# Patient Record
Sex: FEMALE | Race: WHITE | NOT HISPANIC OR LATINO
[De-identification: names, ages, dates, MRNs, and addresses within clinical notes are randomized per-mention and may not be internally consistent; named-entity substitution may affect disease eponyms.]

---

## 2020-08-05 ENCOUNTER — APPOINTMENT (OUTPATIENT)
Dept: ORTHOPEDIC SURGERY | Facility: CLINIC | Age: 65
End: 2020-08-05
Payer: COMMERCIAL

## 2020-08-05 VITALS — TEMPERATURE: 97.3 F

## 2020-08-05 VITALS
DIASTOLIC BLOOD PRESSURE: 79 MMHG | BODY MASS INDEX: 20.03 KG/M2 | WEIGHT: 102 LBS | HEIGHT: 60 IN | HEART RATE: 61 BPM | SYSTOLIC BLOOD PRESSURE: 117 MMHG

## 2020-08-05 PROBLEM — Z00.00 ENCOUNTER FOR PREVENTIVE HEALTH EXAMINATION: Status: ACTIVE | Noted: 2020-08-05

## 2020-08-05 PROCEDURE — 73564 X-RAY EXAM KNEE 4 OR MORE: CPT | Mod: RT

## 2020-08-05 PROCEDURE — 99204 OFFICE O/P NEW MOD 45 MIN: CPT

## 2020-08-05 NOTE — DISCUSSION/SUMMARY
[de-identified] : This patient is right knee pain with positive meniscal signs consistent with meniscal contusion versus tear.  The patient is not an appropriate candidate for surgical intervention at this time. An extensive discussion was conducted on the natural history of the disease and the variety of surgical and non-surgical options available to the patient including, but not limited to non-steroidal anti-inflammatory medications, steroid injections, physical therapy, maintenance of ideal body weight, and reduction of activity.  Recommended a course of Mobic and a home exercise program.  She can also use a neoprene sleeve knee brace.  The patient will schedule an appointment as needed.\par

## 2020-08-05 NOTE — PHYSICAL EXAM
[de-identified] : Patient is well nourished, well-developed, in no acute distress, with appropriate mood and affect. The patient is oriented to time, place, and person. Respirations are even and unlabored. Gait evaluation does not reveal a limp. There is no inguinal adenopathy. Examination of the contralateral knee shows normal range of motion, strength, no tenderness, and intact skin. The affected limb is well-perfused and showed 2+ dp/pt pulses, without skin lesions, shows a grossly normal motor and sensory examination. Knee motion is painless and the knee moves from 0 to 135 degrees. The knee is stable within that range-of-motion to AP and ML stress with a 1A Lachman, negative anterior or posterior drawer and no instability to varus or valgus stress. The alignment of the knee is 5 degrees varus. No effusion or crepitus is noted.  Tender to palpation in the medial joint line.  No tenderness to palpation about the lateral joint line, medial or lateral tibial plateau, medial or lateral femoral condyle, medial or lateral patellar facets, superior or inferior pole of the patella. Ember's is negative and Thessaly is negative. Muscle strength is normal. Pedal pulses are palpable. Hip examination was negative. [de-identified] : Long standing knee, AP knee, lateral knee, and patellar views of the right knee were ordered and taken in the office and demonstrate no evidence of degenerative joint disease of the knee, fractures, intra-articular pathology.

## 2020-08-05 NOTE — HISTORY OF PRESENT ILLNESS
[de-identified] : Patient is a very nice 65 year old female presents today for initial visit with chief complaint of right knee pain. She says this pain started a 1 month ago from playing tennis when she was twisting her knee in a bent position. She describes a mild to moderate dull/achy pain in the anteromedial aspect of the knee, worse with general physical activity and improved nicely with rest. Denies catching/clicking/locking of the knee, radiating pain, or distal neuropathy. She says she cannot get MRIs due to having an iron oxide implant in her face for makeup and cannot receive cortisone injections with having a history retinopathy years ago and was told she should never receive any cortisone medication.  She does not take NSAIDs for this.  She does not use Tylenol.  She is not tried any other types of injections.  She is not tried a neoprene sleeve knee brace.  She is not tried physical therapy.  She does not use a cane or walker.  The patient denies any radiation of the pain to the feet and it is not associated with numbness, tingling, or weakness.  Walking tolerance is not limited.  Activities daily living are only mildly impaired.\par The patient's past medical history, past surgical history, medications, allergies, and social history were reviewed by me today with the patient and documented accordingly. In addition, the patient's family history, which is noncontributory to this visit, was also reviewed.

## 2020-09-30 ENCOUNTER — APPOINTMENT (OUTPATIENT)
Dept: ORTHOPEDIC SURGERY | Facility: CLINIC | Age: 65
End: 2020-09-30
Payer: COMMERCIAL

## 2020-09-30 DIAGNOSIS — M25.561 PAIN IN RIGHT KNEE: ICD-10-CM

## 2020-09-30 PROCEDURE — 99213 OFFICE O/P EST LOW 20 MIN: CPT

## 2020-09-30 NOTE — DISCUSSION/SUMMARY
[de-identified] : This patient's right knee pain is now resolved.  This likely was a meniscal contusion causing the pain.  Continue with conservative management including neoprene sleeve knee brace and meloxicam.  She can restart more aggressive activity on a slowly increased basis.  Home exercise program was reviewed with the patient.  Follow-up on a as needed basis.

## 2020-09-30 NOTE — PHYSICAL EXAM
[de-identified] : Patient is well nourished, well-developed, in no acute distress, with appropriate mood and affect. The patient is oriented to time, place, and person. Respirations are even and unlabored. Gait evaluation does not reveal a limp. There is no inguinal adenopathy. Examination of the contralateral knee shows normal range of motion, strength, no tenderness, and intact skin. The affected limb is well-perfused and showed 2+ dp/pt pulses, without skin lesions, shows a grossly normal motor and sensory examination. Knee motion is painless and the knee moves from 0 to 135 degrees. The knee is stable within that range-of-motion to AP and ML stress with a 1A Lachman, negative anterior or posterior drawer and no instability to varus or valgus stress. The alignment of the knee is 5 degrees varus. No effusion or crepitus is noted. No tenderness to palpation about the medial or lateral joint line, medial or lateral tibial plateau, medial or lateral femoral condyle, medial or lateral patellar facets, superior or inferior pole of the patella. Ember's is negative. Muscle strength is normal. Pedal pulses are palpable. Hip examination was negative.

## 2020-09-30 NOTE — HISTORY OF PRESENT ILLNESS
[de-identified] : Patient is a very nice 65 year old female presents today for initial visit with chief complaint of right knee pain.  Overall significantly improved.  She says this pain started a 1 month ago from playing tennis when she was twisting her knee in a bent position. She describes a mild to moderate dull/achy pain in the anteromedial aspect of the knee, worse with general physical activity and improved nicely with rest. Denies catching/clicking/locking of the knee, radiating pain, or distal neuropathy. She says she cannot get MRIs due to having an iron oxide implant in her face for makeup and cannot receive cortisone injections with having a history retinopathy years ago and was told she should never receive any cortisone medication.  Mobic has helped improve the pain.  She does not use Tylenol. She is not tried any other types of injections.  Neoprene sleeve knee brace has been very helpful.  Rest has been helpful.. She is not tried physical therapy. She does not use a cane or walker. The patient denies any radiation of the pain to the feet and it is not associated with numbness, tingling, or weakness. Walking tolerance is not limited. Activities daily living are only mildly impaired.\par The patient's past medical history, past surgical history, medications, allergies, and social history were reviewed by me today with the patient and documented accordingly. In addition, the patient's family history, which is noncontributory to this visit, was also reviewed.

## 2020-12-27 ENCOUNTER — TRANSCRIPTION ENCOUNTER (OUTPATIENT)
Age: 65
End: 2020-12-27

## 2020-12-28 ENCOUNTER — OUTPATIENT (OUTPATIENT)
Dept: OUTPATIENT SERVICES | Facility: HOSPITAL | Age: 65
LOS: 1 days | Discharge: HOME | End: 2020-12-28

## 2020-12-28 DIAGNOSIS — Z98.89 OTHER SPECIFIED POSTPROCEDURAL STATES: Chronic | ICD-10-CM

## 2021-07-21 ENCOUNTER — APPOINTMENT (OUTPATIENT)
Dept: ORTHOPEDIC SURGERY | Facility: CLINIC | Age: 66
End: 2021-07-21
Payer: MEDICARE

## 2021-07-21 DIAGNOSIS — M25.572 PAIN IN LEFT ANKLE AND JOINTS OF LEFT FOOT: ICD-10-CM

## 2021-07-21 PROCEDURE — 73610 X-RAY EXAM OF ANKLE: CPT | Mod: LT

## 2021-07-21 PROCEDURE — 99213 OFFICE O/P EST LOW 20 MIN: CPT

## 2021-07-21 NOTE — DISCUSSION/SUMMARY
[de-identified] : This patient has peroneal tendinitis.  The patient is not an appropriate candidate for surgical intervention at this time. An extensive discussion was conducted on the natural history of the disease and the variety of surgical and non-surgical options available to the patient including, but not limited to non-steroidal anti-inflammatory medications, steroid injections, physical therapy, maintenance of ideal body weight, and reduction of activity.  Recommended stretching.  Recommended Voltaren gel.  If the pain continues over the next 4 to 6 weeks then I will refer to my foot ankle partners.\par The patient will schedule an appointment as needed.\par

## 2021-07-21 NOTE — HISTORY OF PRESENT ILLNESS
[de-identified] : This is very nice 66-year-old feet experiencing left tibia and ankle pain, which is mild in intensity.  The pain started atraumatically approximately 1 month ago.  It only hurts her at night.  It woke her from sleep 1 time.  No fevers or chills.  No pain during the day.  She is even able to play singles tennis without pain.  No traumas.  Had an ultrasound to rule out DVT.  Has not taken any medications for this.  Her ankles not giving way.  She does not require cane or walker for ambulation.  Has not tried a home exercise program or formal physical therapy.  The patient denies any radiation of the pain to the feet and it is not associated with numbness, tingling, or weakness.

## 2021-07-21 NOTE — PHYSICAL EXAM
[de-identified] : Patient is well nourished, well-developed, in no acute distress, with appropriate mood and affect. The patient is oriented to time, place, and person. Respirations are even and unlabored. Gait evaluation does not reveal a limp. There is no inguinal adenopathy. Examination of the contralateral knee shows normal range of motion, strength, no tenderness, and intact skin. The affected limb is well-perfused and showed 2+ dp/pt pulses, without skin lesions, shows a grossly normal motor and sensory examination. Knee motion is painless and the knee moves from 0 to 135 degrees. The knee is stable within that range-of-motion to AP and ML stress with a 1A Lachman, negative anterior or posterior drawer and no instability to varus or valgus stress. The alignment of the knee is 5 degrees varus. No effusion or crepitus is noted. No tenderness to palpation about the medial or lateral joint line, medial or lateral tibial plateau, medial or lateral femoral condyle, medial or lateral patellar facets, superior or inferior pole of the patella. Ember's is negative. Muscle strength is normal. Pedal pulses are palpable. Hip examination was negative.  Minimally tender to palpation over the peroneal tendons at the muscle belly junction.  Ankle range of motion is 15 degrees dorsiflexion to 25 degrees plantarflexion.  Negative anterior drawer. [de-identified] : AP, mortise, lateral radiographs of the left ankle were ordered obtained the office demonstrate no evidence of fracture or dislocation or other abnormalities.

## 2022-10-07 ENCOUNTER — APPOINTMENT (OUTPATIENT)
Dept: ORTHOPEDIC SURGERY | Facility: CLINIC | Age: 67
End: 2022-10-07

## 2022-10-07 VITALS
HEIGHT: 60 IN | BODY MASS INDEX: 20.03 KG/M2 | WEIGHT: 102 LBS | DIASTOLIC BLOOD PRESSURE: 80 MMHG | HEART RATE: 61 BPM | SYSTOLIC BLOOD PRESSURE: 117 MMHG

## 2022-10-07 DIAGNOSIS — M25.561 PAIN IN RIGHT KNEE: ICD-10-CM

## 2022-10-07 DIAGNOSIS — M25.562 PAIN IN RIGHT KNEE: ICD-10-CM

## 2022-10-07 DIAGNOSIS — G89.29 PAIN IN RIGHT KNEE: ICD-10-CM

## 2022-10-07 PROCEDURE — 99214 OFFICE O/P EST MOD 30 MIN: CPT

## 2022-10-07 PROCEDURE — 73564 X-RAY EXAM KNEE 4 OR MORE: CPT | Mod: 50

## 2022-10-07 NOTE — DISCUSSION/SUMMARY
[de-identified] : This patient has right more than left knee pain which may represent a meniscal contusion.  The patient is not an appropriate candidate for surgical intervention at this time. An extensive discussion was conducted on the natural history of the disease and the variety of surgical and non-surgical options available to the patient including, but not limited to non-steroidal anti-inflammatory medications, steroid injections, physical therapy, maintenance of ideal body weight, and reduction of activity. Continue with conservative management including neoprene sleeve knee brace and meloxicam.  She can restart more aggressive activity on a slowly increased basis.  Home exercise program was reviewed with the patient.  Follow-up on a as needed basis.

## 2022-10-07 NOTE — PHYSICAL EXAM
[de-identified] : Patient is well nourished, well-developed, in no acute distress, with appropriate mood and affect. The patient is oriented to time, place, and person. Respirations are even and unlabored. Gait evaluation does not reveal a limp. There is no inguinal adenopathy. The right limb is well-perfused and showed 2+ dp/pt pulses, without skin lesions, shows a grossly normal motor and sensory examination. Right knee motion is painless and the knee moves from 0 to 135 degrees. The knee is stable within that range-of-motion to AP and ML stress with a 1A Lachman, negative anterior or posterior drawer and no instability to varus or valgus stress. The alignment of the knee is 5 degrees varus. No effusion or crepitus is noted. No tenderness to palpation about the medial or lateral joint line, medial or lateral tibial plateau, medial or lateral femoral condyle, medial or lateral patellar facets, superior or inferior pole of the patella. Ember's is negative. Muscle strength is normal. Pedal pulses are palpable. Hip examination was negative. The left limb is well-perfused and showed 2+ dp/pt pulses, without skin lesions, shows a grossly normal motor and sensory examination. Left knee motion is painless and the knee moves from 0 to 135 degrees. The knee is stable within that range-of-motion to AP and ML stress with a 1A Lachman, negative anterior or posterior drawer and no instability to varus or valgus stress. The alignment of the knee is 5 degrees varus. No effusion or crepitus is noted. No tenderness to palpation about the medial or lateral joint line, medial or lateral tibial plateau, medial or lateral femoral condyle, medial or lateral patellar facets, superior or inferior pole of the patella. Ember's is negative. Muscle strength is normal. Pedal pulses are palpable. Hip examination was negative. [de-identified] : Long standing  knee, AP knee, lateral knee, tunnel and patellar views of the bilat  knee were ordered and taken in the office and do not show any arthritic changes or loss of joint space.

## 2022-10-07 NOTE — HISTORY OF PRESENT ILLNESS
[de-identified] : Patient is a very nice 67 year old female presents today for right knee pain.  She has intermittent pain in the right knee and mild pain in the left knee and the right knee pain is moderate.  The pain is a fluctuating chronically for more than 3 months.  Recently again the pain flared again of the right knee medially. She describes a mild to moderate dull/achy pain in the anteromedial aspect of the knee, worse with general physical activity and improved nicely with rest. Denies catching/clicking/locking of the knee, radiating pain, or distal neuropathy. She says she cannot get MRIs due to having an iron oxide implant in her face for makeup and cannot receive cortisone injections with having a history retinopathy years ago and was told she should never receive any cortisone medication.  . She is not tried any other types of injections.  Neoprene sleeve knee brace has been very helpful.  .. She is not tried physical therapy. She does not use a cane or walker. The patient denies any radiation of the pain to the feet and it is not associated with numbness, tingling, or weakness. Walking tolerance is not limited. Activities daily living are only mildly impaired.

## 2023-10-31 ENCOUNTER — EMERGENCY (EMERGENCY)
Facility: HOSPITAL | Age: 68
LOS: 1 days | Discharge: HOME | End: 2023-10-31
Attending: EMERGENCY MEDICINE | Admitting: EMERGENCY MEDICINE
Payer: MEDICARE

## 2023-10-31 VITALS
TEMPERATURE: 98 F | DIASTOLIC BLOOD PRESSURE: 70 MMHG | SYSTOLIC BLOOD PRESSURE: 113 MMHG | OXYGEN SATURATION: 100 % | HEART RATE: 81 BPM | RESPIRATION RATE: 16 BRPM

## 2023-10-31 VITALS
SYSTOLIC BLOOD PRESSURE: 117 MMHG | HEART RATE: 74 BPM | OXYGEN SATURATION: 99 % | DIASTOLIC BLOOD PRESSURE: 79 MMHG | RESPIRATION RATE: 17 BRPM | TEMPERATURE: 98 F

## 2023-10-31 DIAGNOSIS — Z98.89 OTHER SPECIFIED POSTPROCEDURAL STATES: Chronic | ICD-10-CM

## 2023-10-31 LAB
ALBUMIN SERPL ELPH-MCNC: 4 G/DL — SIGNIFICANT CHANGE UP (ref 3.3–5)
ALBUMIN SERPL ELPH-MCNC: 4 G/DL — SIGNIFICANT CHANGE UP (ref 3.3–5)
ALP SERPL-CCNC: 75 U/L — SIGNIFICANT CHANGE UP (ref 40–120)
ALP SERPL-CCNC: 75 U/L — SIGNIFICANT CHANGE UP (ref 40–120)
ALT FLD-CCNC: 6 U/L — SIGNIFICANT CHANGE UP (ref 4–33)
ALT FLD-CCNC: 6 U/L — SIGNIFICANT CHANGE UP (ref 4–33)
ANION GAP SERPL CALC-SCNC: 13 MMOL/L — SIGNIFICANT CHANGE UP (ref 7–14)
ANION GAP SERPL CALC-SCNC: 13 MMOL/L — SIGNIFICANT CHANGE UP (ref 7–14)
APTT BLD: 33.5 SEC — SIGNIFICANT CHANGE UP (ref 24.5–35.6)
APTT BLD: 33.5 SEC — SIGNIFICANT CHANGE UP (ref 24.5–35.6)
AST SERPL-CCNC: 12 U/L — SIGNIFICANT CHANGE UP (ref 4–32)
AST SERPL-CCNC: 12 U/L — SIGNIFICANT CHANGE UP (ref 4–32)
BASOPHILS # BLD AUTO: 0.05 K/UL — SIGNIFICANT CHANGE UP (ref 0–0.2)
BASOPHILS # BLD AUTO: 0.05 K/UL — SIGNIFICANT CHANGE UP (ref 0–0.2)
BASOPHILS NFR BLD AUTO: 0.8 % — SIGNIFICANT CHANGE UP (ref 0–2)
BASOPHILS NFR BLD AUTO: 0.8 % — SIGNIFICANT CHANGE UP (ref 0–2)
BILIRUB SERPL-MCNC: 0.3 MG/DL — SIGNIFICANT CHANGE UP (ref 0.2–1.2)
BILIRUB SERPL-MCNC: 0.3 MG/DL — SIGNIFICANT CHANGE UP (ref 0.2–1.2)
BUN SERPL-MCNC: 13 MG/DL — SIGNIFICANT CHANGE UP (ref 7–23)
BUN SERPL-MCNC: 13 MG/DL — SIGNIFICANT CHANGE UP (ref 7–23)
CALCIUM SERPL-MCNC: 9.4 MG/DL — SIGNIFICANT CHANGE UP (ref 8.4–10.5)
CALCIUM SERPL-MCNC: 9.4 MG/DL — SIGNIFICANT CHANGE UP (ref 8.4–10.5)
CHLORIDE SERPL-SCNC: 104 MMOL/L — SIGNIFICANT CHANGE UP (ref 98–107)
CHLORIDE SERPL-SCNC: 104 MMOL/L — SIGNIFICANT CHANGE UP (ref 98–107)
CO2 SERPL-SCNC: 25 MMOL/L — SIGNIFICANT CHANGE UP (ref 22–31)
CO2 SERPL-SCNC: 25 MMOL/L — SIGNIFICANT CHANGE UP (ref 22–31)
CREAT SERPL-MCNC: 0.69 MG/DL — SIGNIFICANT CHANGE UP (ref 0.5–1.3)
CREAT SERPL-MCNC: 0.69 MG/DL — SIGNIFICANT CHANGE UP (ref 0.5–1.3)
EGFR: 95 ML/MIN/1.73M2 — SIGNIFICANT CHANGE UP
EGFR: 95 ML/MIN/1.73M2 — SIGNIFICANT CHANGE UP
EOSINOPHIL # BLD AUTO: 0.06 K/UL — SIGNIFICANT CHANGE UP (ref 0–0.5)
EOSINOPHIL # BLD AUTO: 0.06 K/UL — SIGNIFICANT CHANGE UP (ref 0–0.5)
EOSINOPHIL NFR BLD AUTO: 0.9 % — SIGNIFICANT CHANGE UP (ref 0–6)
EOSINOPHIL NFR BLD AUTO: 0.9 % — SIGNIFICANT CHANGE UP (ref 0–6)
GLUCOSE SERPL-MCNC: 85 MG/DL — SIGNIFICANT CHANGE UP (ref 70–99)
GLUCOSE SERPL-MCNC: 85 MG/DL — SIGNIFICANT CHANGE UP (ref 70–99)
HCT VFR BLD CALC: 39.7 % — SIGNIFICANT CHANGE UP (ref 34.5–45)
HCT VFR BLD CALC: 39.7 % — SIGNIFICANT CHANGE UP (ref 34.5–45)
HGB BLD-MCNC: 12.7 G/DL — SIGNIFICANT CHANGE UP (ref 11.5–15.5)
HGB BLD-MCNC: 12.7 G/DL — SIGNIFICANT CHANGE UP (ref 11.5–15.5)
IANC: 4.35 K/UL — SIGNIFICANT CHANGE UP (ref 1.8–7.4)
IANC: 4.35 K/UL — SIGNIFICANT CHANGE UP (ref 1.8–7.4)
IMM GRANULOCYTES NFR BLD AUTO: 0.3 % — SIGNIFICANT CHANGE UP (ref 0–0.9)
IMM GRANULOCYTES NFR BLD AUTO: 0.3 % — SIGNIFICANT CHANGE UP (ref 0–0.9)
INR BLD: 1.11 RATIO — SIGNIFICANT CHANGE UP (ref 0.85–1.18)
INR BLD: 1.11 RATIO — SIGNIFICANT CHANGE UP (ref 0.85–1.18)
LYMPHOCYTES # BLD AUTO: 1.64 K/UL — SIGNIFICANT CHANGE UP (ref 1–3.3)
LYMPHOCYTES # BLD AUTO: 1.64 K/UL — SIGNIFICANT CHANGE UP (ref 1–3.3)
LYMPHOCYTES # BLD AUTO: 25 % — SIGNIFICANT CHANGE UP (ref 13–44)
LYMPHOCYTES # BLD AUTO: 25 % — SIGNIFICANT CHANGE UP (ref 13–44)
MAGNESIUM SERPL-MCNC: 2.4 MG/DL — SIGNIFICANT CHANGE UP (ref 1.6–2.6)
MAGNESIUM SERPL-MCNC: 2.4 MG/DL — SIGNIFICANT CHANGE UP (ref 1.6–2.6)
MCHC RBC-ENTMCNC: 29.4 PG — SIGNIFICANT CHANGE UP (ref 27–34)
MCHC RBC-ENTMCNC: 29.4 PG — SIGNIFICANT CHANGE UP (ref 27–34)
MCHC RBC-ENTMCNC: 32 GM/DL — SIGNIFICANT CHANGE UP (ref 32–36)
MCHC RBC-ENTMCNC: 32 GM/DL — SIGNIFICANT CHANGE UP (ref 32–36)
MCV RBC AUTO: 91.9 FL — SIGNIFICANT CHANGE UP (ref 80–100)
MCV RBC AUTO: 91.9 FL — SIGNIFICANT CHANGE UP (ref 80–100)
MONOCYTES # BLD AUTO: 0.43 K/UL — SIGNIFICANT CHANGE UP (ref 0–0.9)
MONOCYTES # BLD AUTO: 0.43 K/UL — SIGNIFICANT CHANGE UP (ref 0–0.9)
MONOCYTES NFR BLD AUTO: 6.6 % — SIGNIFICANT CHANGE UP (ref 2–14)
MONOCYTES NFR BLD AUTO: 6.6 % — SIGNIFICANT CHANGE UP (ref 2–14)
NEUTROPHILS # BLD AUTO: 4.35 K/UL — SIGNIFICANT CHANGE UP (ref 1.8–7.4)
NEUTROPHILS # BLD AUTO: 4.35 K/UL — SIGNIFICANT CHANGE UP (ref 1.8–7.4)
NEUTROPHILS NFR BLD AUTO: 66.4 % — SIGNIFICANT CHANGE UP (ref 43–77)
NEUTROPHILS NFR BLD AUTO: 66.4 % — SIGNIFICANT CHANGE UP (ref 43–77)
NRBC # BLD: 0 /100 WBCS — SIGNIFICANT CHANGE UP (ref 0–0)
NRBC # BLD: 0 /100 WBCS — SIGNIFICANT CHANGE UP (ref 0–0)
NRBC # FLD: 0 K/UL — SIGNIFICANT CHANGE UP (ref 0–0)
NRBC # FLD: 0 K/UL — SIGNIFICANT CHANGE UP (ref 0–0)
PLATELET # BLD AUTO: 414 K/UL — HIGH (ref 150–400)
PLATELET # BLD AUTO: 414 K/UL — HIGH (ref 150–400)
POTASSIUM SERPL-MCNC: 4.5 MMOL/L — SIGNIFICANT CHANGE UP (ref 3.5–5.3)
POTASSIUM SERPL-MCNC: 4.5 MMOL/L — SIGNIFICANT CHANGE UP (ref 3.5–5.3)
POTASSIUM SERPL-SCNC: 4.5 MMOL/L — SIGNIFICANT CHANGE UP (ref 3.5–5.3)
POTASSIUM SERPL-SCNC: 4.5 MMOL/L — SIGNIFICANT CHANGE UP (ref 3.5–5.3)
PROT SERPL-MCNC: 7.4 G/DL — SIGNIFICANT CHANGE UP (ref 6–8.3)
PROT SERPL-MCNC: 7.4 G/DL — SIGNIFICANT CHANGE UP (ref 6–8.3)
PROTHROM AB SERPL-ACNC: 12.5 SEC — SIGNIFICANT CHANGE UP (ref 9.5–13)
PROTHROM AB SERPL-ACNC: 12.5 SEC — SIGNIFICANT CHANGE UP (ref 9.5–13)
RBC # BLD: 4.32 M/UL — SIGNIFICANT CHANGE UP (ref 3.8–5.2)
RBC # BLD: 4.32 M/UL — SIGNIFICANT CHANGE UP (ref 3.8–5.2)
RBC # FLD: 12.4 % — SIGNIFICANT CHANGE UP (ref 10.3–14.5)
RBC # FLD: 12.4 % — SIGNIFICANT CHANGE UP (ref 10.3–14.5)
SODIUM SERPL-SCNC: 142 MMOL/L — SIGNIFICANT CHANGE UP (ref 135–145)
SODIUM SERPL-SCNC: 142 MMOL/L — SIGNIFICANT CHANGE UP (ref 135–145)
T3 SERPL-MCNC: 103 NG/DL — SIGNIFICANT CHANGE UP (ref 80–200)
T3 SERPL-MCNC: 103 NG/DL — SIGNIFICANT CHANGE UP (ref 80–200)
T4 AB SER-ACNC: 7.2 UG/DL — SIGNIFICANT CHANGE UP (ref 5.1–13)
T4 AB SER-ACNC: 7.2 UG/DL — SIGNIFICANT CHANGE UP (ref 5.1–13)
TSH SERPL-MCNC: 0.44 UIU/ML — SIGNIFICANT CHANGE UP (ref 0.27–4.2)
TSH SERPL-MCNC: 0.44 UIU/ML — SIGNIFICANT CHANGE UP (ref 0.27–4.2)
WBC # BLD: 6.55 K/UL — SIGNIFICANT CHANGE UP (ref 3.8–10.5)
WBC # BLD: 6.55 K/UL — SIGNIFICANT CHANGE UP (ref 3.8–10.5)
WBC # FLD AUTO: 6.55 K/UL — SIGNIFICANT CHANGE UP (ref 3.8–10.5)
WBC # FLD AUTO: 6.55 K/UL — SIGNIFICANT CHANGE UP (ref 3.8–10.5)

## 2023-10-31 PROCEDURE — 70498 CT ANGIOGRAPHY NECK: CPT | Mod: 26,MA

## 2023-10-31 PROCEDURE — 93010 ELECTROCARDIOGRAM REPORT: CPT

## 2023-10-31 PROCEDURE — 71046 X-RAY EXAM CHEST 2 VIEWS: CPT | Mod: 26

## 2023-10-31 PROCEDURE — 99285 EMERGENCY DEPT VISIT HI MDM: CPT | Mod: GC

## 2023-10-31 PROCEDURE — 70496 CT ANGIOGRAPHY HEAD: CPT | Mod: 26,MA

## 2023-10-31 RX ORDER — DEXAMETHASONE 0.5 MG/5ML
6 ELIXIR ORAL ONCE
Refills: 0 | Status: COMPLETED | OUTPATIENT
Start: 2023-10-31 | End: 2023-10-31

## 2023-10-31 RX ORDER — SODIUM CHLORIDE 9 MG/ML
1000 INJECTION INTRAMUSCULAR; INTRAVENOUS; SUBCUTANEOUS ONCE
Refills: 0 | Status: COMPLETED | OUTPATIENT
Start: 2023-10-31 | End: 2023-10-31

## 2023-10-31 RX ADMIN — SODIUM CHLORIDE 1000 MILLILITER(S): 9 INJECTION INTRAMUSCULAR; INTRAVENOUS; SUBCUTANEOUS at 12:26

## 2023-10-31 NOTE — ED PROVIDER NOTE - NSFOLLOWUPINSTRUCTIONS_ED_ALL_ED_FT
YOU WERE SEEN IN THE ED FOR: Dizziness and throat pain/painful swallowing     PLEASE FOLLOW UP WITH ENDOCRINOLOGY AS SOON AS POSSIBLE. YOU SHOULD RECEIVE A CALL FROM OUR PATIENT CARE COORDINATOR TO HELP ARRANGE AN APPOINTMENT. IF NOT, PLEASE CALL THE NUMBERS LISTED.     FOR PAIN/FEVER, YOU MAY TAKE TYLENOL (acetaminophen) AND/OR IBUPROFEN (advil or motrin). FOLLOW THE INSTRUCTIONS ON THE LABEL/CONTAINER.    PLEASE FOLLOW UP WITH YOUR PRIVATE PHYSICIAN WITHIN THE NEXT 48 HOURS. BRING COPIES OF YOUR RESULTS.    RETURN TO THE EMERGENCY DEPARTMENT IF YOU EXPERIENCE ANY NEW/CONCERNING/WORSENING SYMPTOMS SUCH AS BUT NOT LIMITED TO: inability to swallow solids or liquids, drooling, shortness of breath, chest pain, fever, worsening dizziness or fainting or any other concerns.

## 2023-10-31 NOTE — ED PROVIDER NOTE - PROVIDER TOKENS
PROVIDER:[TOKEN:[[id number]]],PROVIDER:[TOKEN:[[id number]]],PROVIDER:[TOKEN:[[id number]]] PROVIDER:[TOKEN:[[id number]]],PROVIDER:[TOKEN:[[id number]]],PROVIDER:[TOKEN:[[id number]]],PROVIDER:[TOKEN:[[id number]]]

## 2023-10-31 NOTE — ED PROVIDER NOTE - CLINICAL SUMMARY MEDICAL DECISION MAKING FREE TEXT BOX
68 year old female with hx of thyroid goiter presents with throat pain, difficulty swallowing, night sweats, and dizziness x 3 weeks. On exam, patient with enlarged thyroid gland, however vitals all WNL - not tachycardic or febrile, thus not concerning for thyroid toxicosis at this time. She is tolerating secretions without difficulty breathing or stridor, on concern for airway compromise at this time. Patient is not currently dizzy, no room spinning. Neuro exam is intact, without deficits. Plan for labs, thyroid panel, ecg, CTA head and neck.

## 2023-10-31 NOTE — ED ADULT TRIAGE NOTE - CHIEF COMPLAINT QUOTE
Pt c/o throat pain,  difficulty swallowing (goiter), palpitations,  headache, dizziness  when laying down, night sweats and a weird taste in mouth x few weeks

## 2023-10-31 NOTE — ED PROVIDER NOTE - ATTENDING CONTRIBUTION TO CARE
DR. BLOCH, ATTENDING MD-  I performed a face to face bedside interview with patient regarding history of present illness, review of symptoms and past medical history. I completed an independent physical exam.  I have discussed patient's plan of care with the resident.  Patient NAD Thin female, NAd, voice nml, PERRL, throat nml, neck with small goiter, no bruits, no stridor, neck supple heart s1s2, lungs clear, abd soft nontender, extrem no edema. pulses intact.

## 2023-10-31 NOTE — ED PROVIDER NOTE - CARE PROVIDERS DIRECT ADDRESSES
,DirectAddress_Unknown,[email or web address] ,[email or web address],[email or web address]

## 2023-10-31 NOTE — ED PROVIDER NOTE - PATIENT PORTAL LINK FT
You can access the FollowMyHealth Patient Portal offered by [facility] by registering at the following website: [email or web address]. By joining [employer]’s FollowMyHealth portal, you will also be able to view your health information using other applications (apps) compatible with our system.

## 2023-10-31 NOTE — ED PROVIDER NOTE - NSFOLLOWUPCLINICS_GEN_ALL_ED_FT
[facility] Endocrinology  Endocrinology  [address] [place] [address]  Phone: [phone]  Fax:     [facility] - ENT  Otolaryngology (ENT)  [address] [place] [address]  Phone: [phone]  Fax:

## 2023-10-31 NOTE — ED PROVIDER NOTE - CARE PROVIDER_API CALL
Erin Fuentes  Endocrinology/Metab/Diabetes  [address] [facility], [facility] [address]  [place] [address]  Phone: [phone]  Fax: [phone]  Follow Up Time:     Roger Payne  Endocrinology/Metab/Diabetes  [address] [place] [address]  Phone: [phone]  Fax: [phone]  Follow Up Time:     Jessika Perez  Endocrinology/Metab/Diabetes  [address] [facility], [facility] [address]  [place] [address]  Phone: [phone]  Fax: [phone]  Follow Up Time:    Erin Fuentes  Endocrinology/Metab/Diabetes  [address] [facility], [facility] [address]  [place] [address]  Phone: [phone]  Fax: [phone]  Follow Up Time:     Roger Payne  Endocrinology/Metab/Diabetes  [address] [place] [address]  Phone: [phone]  Fax: [phone]  Follow Up Time:     Jessika Perez  Endocrinology/Metab/Diabetes  [address] [facility], [facility] [address]  [place] [address]  Phone: [phone]  Fax: [phone]  Follow Up Time:     Itz Doll  Otolaryngology  [address] [facility]  [place] [address]  Phone: [phone]  Fax: [phone]  Follow Up Time:

## 2023-10-31 NOTE — ED PROVIDER NOTE - PROGRESS NOTE DETAILS
Shaniqua Maxwell,  (PGY-1): CTA head and neck without significant findings. Multiple thyroid nodules seen. Patient notes she had ENT evaluation a few weeks ago which was normal per patient - however her throat and swallowing symptoms started afterward. Will consult ENT for repeat scope. Shaniqua Maxwell,  (PGY-1): ENT without abnormalities on bedside scope. Likely subacute thyroiditis. Will discharge with endo referral. Patient understands and agrees with plan.

## 2023-10-31 NOTE — ED PROVIDER NOTE - PHYSICAL EXAMINATION
GEN: NAD, awake, eyes open spontaneously  EYES: normal conjunctiva, perrl, eomi with horizontal nystagmus (R>L), no vertical nystagmus.   ENT: NCAT, MMM, oropharynx without visible edema. No drooling or trismus. Tolerating secretions. No hoarseness. Trachea midline with enlarged thyroid gland with minimal tenderness to palpation.   CHEST/LUNGS: Non-tachypneic, CTAB, bilateral breath sounds  CARDIAC: Non-tachycardic, normal perfusion  ABDOMEN: Soft, NTND, No rebound/guarding  MSK: No edema, no gross deformity of extremities  SKIN: No rashes, no petechiae, no vesicles  NEURO: Normal patellar reflexes bilaterally. CN grossly intact, normal coordination, no focal motor or sensory deficits  PSYCH: Alert, appropriate, cooperative, with capacity and insight

## 2023-10-31 NOTE — ED PROVIDER NOTE - NS ED ROS FT
CONSTITUTIONAL: No fevers, no chills, + lightheadedness, + dizziness  EYES: no visual changes, no eye pain  EARS: no ear drainage, no ear pain, no change in hearing  NOSE: no nasal congestion  MOUTH/THROAT: + sore throat  CV: No chest pain, no palpitations  RESP: No SOB, no cough  GI: No n/v/d, no abd pain  : no dysuria, no hematuria, no flank pain  MSK: no back pain, no extremity pain  SKIN: no rashes  NEURO: no headache, no focal weakness, no decreased sensation/parasthesias   PSYCHIATRIC: no known mental health issues

## 2023-10-31 NOTE — ED PROVIDER NOTE - OBJECTIVE STATEMENT
68 year old female with hx of thyroid goiter presents to the ED complaining of night sweats, throat pain, difficulty swallowing, and dizziness x 3 weeks. States she wakes up from sleep drenched in sweat. She has throat pain with swallowing, but is able to tolerate secretions and PO intake. She describes a terrible flavor in her mouth, which has decreased her appetite. She endorses 3 episodes of dizziness, occur when she is lying flat with room spinning sensation and resolve when sitting upright. She was evaluated by ENT outpatient and reports her exam was normal. She was given a prescription for outpatient imaging studies including thyroid ultrasound and CT neck. Otherwise, denies chest pain, sob, palpitations, nausea, vomiting, diarrhea, urinary symptoms.

## 2023-11-01 ENCOUNTER — NON-APPOINTMENT (OUTPATIENT)
Age: 68
End: 2023-11-01

## 2024-02-16 PROBLEM — E04.9 NONTOXIC GOITER, UNSPECIFIED: Chronic | Status: ACTIVE | Noted: 2023-10-31

## 2024-02-26 ENCOUNTER — APPOINTMENT (OUTPATIENT)
Dept: SURGERY | Facility: CLINIC | Age: 69
End: 2024-02-26
Payer: MEDICARE

## 2024-02-26 ENCOUNTER — RESULT REVIEW (OUTPATIENT)
Age: 69
End: 2024-02-26

## 2024-02-26 VITALS
BODY MASS INDEX: 20.03 KG/M2 | TEMPERATURE: 98.4 F | HEIGHT: 60 IN | OXYGEN SATURATION: 99 % | WEIGHT: 102 LBS | SYSTOLIC BLOOD PRESSURE: 111 MMHG | DIASTOLIC BLOOD PRESSURE: 64 MMHG | HEART RATE: 68 BPM

## 2024-02-26 DIAGNOSIS — Z78.9 OTHER SPECIFIED HEALTH STATUS: ICD-10-CM

## 2024-02-26 DIAGNOSIS — R19.09 OTHER INTRA-ABDOMINAL AND PELVIC SWELLING, MASS AND LUMP: ICD-10-CM

## 2024-02-26 DIAGNOSIS — K40.90 UNILATERAL INGUINAL HERNIA, W/OUT OBSTRUCTION OR GANGRENE, NOT SPECIFIED AS RECURRENT: ICD-10-CM

## 2024-02-26 PROCEDURE — 99203 OFFICE O/P NEW LOW 30 MIN: CPT

## 2024-02-26 RX ORDER — MELOXICAM 15 MG/1
15 TABLET ORAL DAILY
Qty: 30 | Refills: 2 | Status: COMPLETED | COMMUNITY
Start: 2020-08-05 | End: 2024-02-26

## 2024-02-27 ENCOUNTER — APPOINTMENT (OUTPATIENT)
Dept: CT IMAGING | Facility: CLINIC | Age: 69
End: 2024-02-27
Payer: MEDICARE

## 2024-02-27 ENCOUNTER — TRANSCRIPTION ENCOUNTER (OUTPATIENT)
Age: 69
End: 2024-02-27

## 2024-02-27 PROCEDURE — 74177 CT ABD & PELVIS W/CONTRAST: CPT

## 2024-02-29 ENCOUNTER — TRANSCRIPTION ENCOUNTER (OUTPATIENT)
Age: 69
End: 2024-02-29

## 2024-03-04 ENCOUNTER — APPOINTMENT (OUTPATIENT)
Dept: SURGERY | Facility: CLINIC | Age: 69
End: 2024-03-04
Payer: MEDICARE

## 2024-03-04 VITALS
WEIGHT: 102 LBS | HEART RATE: 73 BPM | BODY MASS INDEX: 20.03 KG/M2 | TEMPERATURE: 98.4 F | HEIGHT: 60 IN | SYSTOLIC BLOOD PRESSURE: 101 MMHG | DIASTOLIC BLOOD PRESSURE: 69 MMHG

## 2024-03-04 DIAGNOSIS — Z78.9 OTHER SPECIFIED HEALTH STATUS: ICD-10-CM

## 2024-03-04 PROCEDURE — 99203 OFFICE O/P NEW LOW 30 MIN: CPT

## 2024-03-06 ENCOUNTER — TRANSCRIPTION ENCOUNTER (OUTPATIENT)
Age: 69
End: 2024-03-06

## 2024-03-07 NOTE — PLAN
[FreeTextEntry1] : 69 year old woman with likely inguinal hernia - signs/symptoms of incarceration, strangulation, and obstruction discussed with the patient. They are aware that if they develop to above signs/symptoms to go to the ED immediately. - follow up CT abd/pelvis - risks, benefits, and alternatives to laparoscopic, robotic assisted inguinal hernia repair with mesh discussed with patient at length. All questions answered.

## 2024-03-07 NOTE — PHYSICAL EXAM
[No Rash or Lesion] : No rash or lesion [Oriented to Person] : oriented to person [Alert] : alert [Oriented to Place] : oriented to place [de-identified] : comfortable, well appearing [Calm] : calm [de-identified] : no scleral icterus [de-identified] : breathing comfortably on room air, no cough [de-identified] : soft, not tender and not distended No rebound, no guarding Palpable mass in left ingunal area, no skin changes

## 2024-03-07 NOTE — HISTORY OF PRESENT ILLNESS
[de-identified] : 69 year old woman presents to the office with complaints of a lump in her groin. She state she first noticed it a few days ago. States gets larger and smaller depending on activity and positioning. States she was very concerned that it was a hernia; is aware of hernia repair because her  just had his inguinal hernias repaired by Dr. Elias.

## 2024-03-08 ENCOUNTER — OUTPATIENT (OUTPATIENT)
Dept: OUTPATIENT SERVICES | Facility: HOSPITAL | Age: 69
LOS: 1 days | Discharge: HOME | End: 2024-03-08

## 2024-03-08 VITALS
TEMPERATURE: 98 F | DIASTOLIC BLOOD PRESSURE: 58 MMHG | SYSTOLIC BLOOD PRESSURE: 92 MMHG | RESPIRATION RATE: 18 BRPM | WEIGHT: 104.06 LBS | HEIGHT: 60 IN | OXYGEN SATURATION: 99 % | HEART RATE: 80 BPM

## 2024-03-08 DIAGNOSIS — Z01.818 ENCOUNTER FOR OTHER PREPROCEDURAL EXAMINATION: ICD-10-CM

## 2024-03-08 DIAGNOSIS — M65.30 TRIGGER FINGER, UNSPECIFIED FINGER: Chronic | ICD-10-CM

## 2024-03-08 DIAGNOSIS — K40.20 BILATERAL INGUINAL HERNIA, WITHOUT OBSTRUCTION OR GANGRENE, NOT SPECIFIED AS RECURRENT: ICD-10-CM

## 2024-03-08 DIAGNOSIS — Z98.89 OTHER SPECIFIED POSTPROCEDURAL STATES: Chronic | ICD-10-CM

## 2024-03-08 DIAGNOSIS — K42.9 UMBILICAL HERNIA WITHOUT OBSTRUCTION OR GANGRENE: ICD-10-CM

## 2024-03-08 LAB
ANION GAP SERPL CALC-SCNC: 3 MMOL/L — LOW (ref 5–17)
BUN SERPL-MCNC: 20 MG/DL — SIGNIFICANT CHANGE UP (ref 7–23)
CALCIUM SERPL-MCNC: 9 MG/DL — SIGNIFICANT CHANGE UP (ref 8.5–10.1)
CHLORIDE SERPL-SCNC: 113 MMOL/L — HIGH (ref 96–108)
CO2 SERPL-SCNC: 28 MMOL/L — SIGNIFICANT CHANGE UP (ref 22–31)
CREAT SERPL-MCNC: 0.72 MG/DL — SIGNIFICANT CHANGE UP (ref 0.5–1.3)
EGFR: 91 ML/MIN/1.73M2 — SIGNIFICANT CHANGE UP
GLUCOSE SERPL-MCNC: 89 MG/DL — SIGNIFICANT CHANGE UP (ref 70–99)
HCT VFR BLD CALC: 44.9 % — SIGNIFICANT CHANGE UP (ref 34.5–45)
HGB BLD-MCNC: 14.6 G/DL — SIGNIFICANT CHANGE UP (ref 11.5–15.5)
MCHC RBC-ENTMCNC: 30 PG — SIGNIFICANT CHANGE UP (ref 27–34)
MCHC RBC-ENTMCNC: 32.5 G/DL — SIGNIFICANT CHANGE UP (ref 32–36)
MCV RBC AUTO: 92.4 FL — SIGNIFICANT CHANGE UP (ref 80–100)
NRBC # BLD: 0 /100 WBCS — SIGNIFICANT CHANGE UP (ref 0–0)
PLATELET # BLD AUTO: 266 K/UL — SIGNIFICANT CHANGE UP (ref 150–400)
POTASSIUM SERPL-MCNC: 3.8 MMOL/L — SIGNIFICANT CHANGE UP (ref 3.5–5.3)
POTASSIUM SERPL-SCNC: 3.8 MMOL/L — SIGNIFICANT CHANGE UP (ref 3.5–5.3)
RBC # BLD: 4.86 M/UL — SIGNIFICANT CHANGE UP (ref 3.8–5.2)
RBC # FLD: 13.4 % — SIGNIFICANT CHANGE UP (ref 10.3–14.5)
SODIUM SERPL-SCNC: 144 MMOL/L — SIGNIFICANT CHANGE UP (ref 135–145)
WBC # BLD: 6.98 K/UL — SIGNIFICANT CHANGE UP (ref 3.8–10.5)
WBC # FLD AUTO: 6.98 K/UL — SIGNIFICANT CHANGE UP (ref 3.8–10.5)

## 2024-03-08 NOTE — H&P PST ADULT - CARDIOVASCULAR
details… regular rate and rhythm/S1 S2 present/no gallops/no rub/no murmur regular rate and rhythm/S1 S2 present/no gallops/no rub

## 2024-03-08 NOTE — H&P PST ADULT - NSANTHOSAYNRD_GEN_A_CORE
No. PHILLY screening performed.  STOP BANG Legend: 0-2 = LOW Risk; 3-4 = INTERMEDIATE Risk; 5-8 = HIGH Risk

## 2024-03-08 NOTE — H&P PST ADULT - PROBLEM SELECTOR PLAN 2
Labs-CBC, BMP,, EKG   M/C required    Preop Hibiclens x 1 day instructions reviewed and given.  Take routine meds DOS with small sips of water, avoid NSAIDs and OTC supplements  Anesthesiologist to review PST labs, EKG, required clearances, and optimization for surgery

## 2024-03-08 NOTE — H&P PST ADULT - HISTORY OF PRESENT ILLNESS
59 yo female c/o left thumb and middle finger pain- had evaluation- trigger finger = had therapy without improvement 69Fpresents to PST for scheduled lap b/l inguinal hernia repair and umbilical hernia repair on 3/12/24

## 2024-03-08 NOTE — H&P PST ADULT - SKIN/BREAST
[facility]  [address]  [facility] [address]  Phone:  [phone]  Fax:  [phone]                                    Melody Muñoz   MRN: [id number]    Department:  [facility]   Date of Visit:  11/23/2019           After Visit Summary Signature Page    I have received my discharge instructions, and my questions have been answered. I have discussed any challenges I see with this plan with the nurse or doctor.    ..........................................................................................................................................  Patient/Patient Representative Signature      ..........................................................................................................................................  Patient Representative Print Name and Relationship to Patient    ..................................................               ................................................  Date                                   Time    ..........................................................................................................................................  Reviewed by Signature/Title    ...................................................              ..............................................  Date                                               Time          22EPIC Rev 08/18        negative

## 2024-03-08 NOTE — H&P PST ADULT - ASSESSMENT
69Fpresents to PST for scheduled lap b/l inguinal hernia repair and umbilical hernia repair on 3/12/24    CAPRINI SCORE [CLOT]    AGE RELATED RISK FACTORS                                                       MOBILITY RELATED FACTORS  [ ] Age 41-60 years                                            (1 Point)                  [ ] Bed rest                                                        (1 Point)  [ x] Age: 61-74 years                                           (2 Points)                 [ ] Plaster cast                                                   (2 Points)  [ ] Age= 75 years                                              (3 Points)                 [ ] Bed bound for more than 72 hours                 (2 Points)    DISEASE RELATED RISK FACTORS                                               GENDER SPECIFIC FACTORS  [ ] Edema in the lower extremities                       (1 Point)                  [ ] Pregnancy                                                     (1 Point)  [ ] Varicose veins                                               (1 Point)                  [ ] Post-partum < 6 weeks                                   (1 Point)             [ ] BMI > 25 Kg/m2                                            (1 Point)                  [ ] Hormonal therapy  or oral contraception          (1 Point)                 [ ] Sepsis (in the previous month)                        (1 Point)                  [ ] History of pregnancy complications                 (1 point)  [ ] Pneumonia or serious lung disease                                               [ ] Unexplained or recurrent                     (1 Point)           (in the previous month)                               (1 Point)  [ ] Abnormal pulmonary function test                     (1 Point)                 SURGERY RELATED RISK FACTORS  [ ] Acute myocardial infarction                              (1 Point)                 [ ]  Section                                             (1 Point)  [ ] Congestive heart failure (in the previous month)  (1 Point)               [ ] Minor surgery                                                  (1 Point)   [ ] Inflammatory bowel disease                             (1 Point)                 [ ] Arthroscopic surgery                                        (2 Points)  [ ] Central venous access                                      (2 Points)                [x ] General surgery lasting more than 45 minutes   (2 Points)       [ ] Stroke (in the previous month)                          (5 Points)               [ ] Elective arthroplasty                                         (5 Points)                                                                                                                                               HEMATOLOGY RELATED FACTORS                                                 TRAUMA RELATED RISK FACTORS  [ ] Prior episodes of VTE                                     (3 Points)                [ ] Fracture of the hip, pelvis, or leg                       (5 Points)  [ ] Positive family history for VTE                         (3 Points)                 [ ] Acute spinal cord injury (in the previous month)  (5 Points)  [ ] Prothrombin 74043 A                                     (3 Points)                 [ ] Paralysis  (less than 1 month)                             (5 Points)  [ ] Factor V Leiden                                             (3 Points)                  [ ] Multiple Trauma within 1 month                        (5 Points)  [ ] Lupus anticoagulants                                     (3 Points)                                                           [ ] Anticardiolipin antibodies                               (3 Points)                                                       [ ] High homocysteine in the blood                      (3 Points)                                             [ ] Other congenital or acquired thrombophilia      (3 Points)                                                [ ] Heparin induced thrombocytopenia                  (3 Points)                                          Total Score [   4       ]    Caprini Score 0 - 2:  Low Risk, No VTE Prophylaxis required for most patients, encourage ambulation  Caprini Score 3 - 6:  At Risk, pharmacologic VTE prophylaxis is indicated for most patients (in the absence of a contraindication)  Caprini Score Greater than or = 7:  High Risk, pharmacologic VTE prophylaxis is indicated for most patients (in the absence of a contraindication)

## 2024-03-11 ENCOUNTER — TRANSCRIPTION ENCOUNTER (OUTPATIENT)
Age: 69
End: 2024-03-11

## 2024-03-12 ENCOUNTER — TRANSCRIPTION ENCOUNTER (OUTPATIENT)
Age: 69
End: 2024-03-12

## 2024-03-12 ENCOUNTER — OUTPATIENT (OUTPATIENT)
Dept: OUTPATIENT SERVICES | Facility: HOSPITAL | Age: 69
LOS: 1 days | Discharge: HOME | End: 2024-03-12
Payer: MEDICARE

## 2024-03-12 ENCOUNTER — APPOINTMENT (OUTPATIENT)
Dept: SURGERY | Facility: HOSPITAL | Age: 69
End: 2024-03-12

## 2024-03-12 VITALS
RESPIRATION RATE: 14 BRPM | SYSTOLIC BLOOD PRESSURE: 101 MMHG | DIASTOLIC BLOOD PRESSURE: 62 MMHG | OXYGEN SATURATION: 98 % | HEART RATE: 60 BPM

## 2024-03-12 VITALS
TEMPERATURE: 98 F | DIASTOLIC BLOOD PRESSURE: 75 MMHG | WEIGHT: 201.06 LBS | SYSTOLIC BLOOD PRESSURE: 112 MMHG | HEIGHT: 60 IN | HEART RATE: 52 BPM | RESPIRATION RATE: 17 BRPM | OXYGEN SATURATION: 100 %

## 2024-03-12 DIAGNOSIS — M65.30 TRIGGER FINGER, UNSPECIFIED FINGER: Chronic | ICD-10-CM

## 2024-03-12 DIAGNOSIS — Z98.89 OTHER SPECIFIED POSTPROCEDURAL STATES: Chronic | ICD-10-CM

## 2024-03-12 PROCEDURE — 49650 LAP ING HERNIA REPAIR INIT: CPT | Mod: 50,GC

## 2024-03-12 PROCEDURE — 49591 RPR AA HRN 1ST < 3 CM RDC: CPT | Mod: GC

## 2024-03-12 DEVICE — TROCAR COVIDIEN SPACEMAKER PRO BLUNT TIP 10MM-12MM WITH DISSECTION BALLOON OVAL: Type: IMPLANTABLE DEVICE | Site: BILATERAL | Status: FUNCTIONAL

## 2024-03-12 DEVICE — MESH HERNIA INGUINAL PROGRIP LAPAROSCOPIC 15 X 10CM LEFT: Type: IMPLANTABLE DEVICE | Site: BILATERAL | Status: FUNCTIONAL

## 2024-03-12 DEVICE — MESH HERNIA INGUINAL PROGRIP LAPAROSCOPIC 15 X 10CM RIGHT: Type: IMPLANTABLE DEVICE | Site: BILATERAL | Status: FUNCTIONAL

## 2024-03-12 RX ORDER — FENTANYL CITRATE 50 UG/ML
50 INJECTION INTRAVENOUS
Refills: 0 | Status: DISCONTINUED | OUTPATIENT
Start: 2024-03-12 | End: 2024-03-12

## 2024-03-12 RX ORDER — ONDANSETRON 8 MG/1
4 TABLET, FILM COATED ORAL ONCE
Refills: 0 | Status: COMPLETED | OUTPATIENT
Start: 2024-03-12 | End: 2024-03-12

## 2024-03-12 RX ORDER — FENTANYL CITRATE 50 UG/ML
25 INJECTION INTRAVENOUS
Refills: 0 | Status: DISCONTINUED | OUTPATIENT
Start: 2024-03-12 | End: 2024-03-12

## 2024-03-12 RX ORDER — SODIUM CHLORIDE 9 MG/ML
3 INJECTION INTRAMUSCULAR; INTRAVENOUS; SUBCUTANEOUS EVERY 8 HOURS
Refills: 0 | Status: DISCONTINUED | OUTPATIENT
Start: 2024-03-12 | End: 2024-03-12

## 2024-03-12 RX ORDER — SODIUM CHLORIDE 9 MG/ML
1000 INJECTION, SOLUTION INTRAVENOUS
Refills: 0 | Status: DISCONTINUED | OUTPATIENT
Start: 2024-03-12 | End: 2024-03-12

## 2024-03-12 RX ADMIN — FENTANYL CITRATE 25 MICROGRAM(S): 50 INJECTION INTRAVENOUS at 13:21

## 2024-03-12 RX ADMIN — FENTANYL CITRATE 25 MICROGRAM(S): 50 INJECTION INTRAVENOUS at 13:00

## 2024-03-12 RX ADMIN — SODIUM CHLORIDE 75 MILLILITER(S): 9 INJECTION, SOLUTION INTRAVENOUS at 13:27

## 2024-03-12 RX ADMIN — ONDANSETRON 4 MILLIGRAM(S): 8 TABLET, FILM COATED ORAL at 13:39

## 2024-03-12 NOTE — ASU DISCHARGE PLAN (ADULT/PEDIATRIC) - CARE PROVIDER_API CALL
Edison Elias  Surgery  [address] [place] [address]  Phone: [phone]  Fax: [phone]  Follow Up Time:

## 2024-03-12 NOTE — ASU PREOP CHECKLIST - PATIENT'S PERSONAL PROPERTY REMOVED
Refill request for:    Requested Prescriptions     Pending Prescriptions Disp Refills   • FALMINA 0.1-20 MG-MCG Oral Tab [Pharmacy Med Name: Gina Marko Oral Tablet 0.1-20 MG-MCG] 28 tablet 0     Sig: TAKE 1 TABLET BY MOUTH ONE TIME A DAY          LOV 8/15/201 glasses

## 2024-03-12 NOTE — ASU PREOP CHECKLIST - BP NONINVASIVE SYSTOLIC (MM HG)
Arava Pregnancy And Lactation Text: This medication is Pregnancy Category X and is absolutely contraindicated during pregnancy. It is unknown if it is excreted in breast milk. 112

## 2024-03-13 PROBLEM — Z86.69 PERSONAL HISTORY OF OTHER DISEASES OF THE NERVOUS SYSTEM AND SENSE ORGANS: Chronic | Status: ACTIVE | Noted: 2024-03-08

## 2024-03-18 ENCOUNTER — APPOINTMENT (OUTPATIENT)
Dept: SURGERY | Facility: CLINIC | Age: 69
End: 2024-03-18
Payer: MEDICARE

## 2024-03-18 VITALS
TEMPERATURE: 98.1 F | WEIGHT: 102 LBS | HEIGHT: 60 IN | BODY MASS INDEX: 20.03 KG/M2 | SYSTOLIC BLOOD PRESSURE: 125 MMHG | HEART RATE: 60 BPM | DIASTOLIC BLOOD PRESSURE: 75 MMHG

## 2024-03-18 PROCEDURE — 99024 POSTOP FOLLOW-UP VISIT: CPT

## 2024-03-25 ENCOUNTER — APPOINTMENT (OUTPATIENT)
Dept: SURGERY | Facility: CLINIC | Age: 69
End: 2024-03-25
Payer: MEDICARE

## 2024-03-25 VITALS
DIASTOLIC BLOOD PRESSURE: 71 MMHG | TEMPERATURE: 98.9 F | HEIGHT: 60 IN | HEART RATE: 66 BPM | SYSTOLIC BLOOD PRESSURE: 111 MMHG | BODY MASS INDEX: 20.03 KG/M2 | WEIGHT: 102 LBS

## 2024-03-25 PROCEDURE — 99024 POSTOP FOLLOW-UP VISIT: CPT

## 2024-03-26 DIAGNOSIS — K40.20 BILATERAL INGUINAL HERNIA, WITHOUT OBSTRUCTION OR GANGRENE, NOT SPECIFIED AS RECURRENT: ICD-10-CM

## 2024-03-26 DIAGNOSIS — K42.9 UMBILICAL HERNIA WITHOUT OBSTRUCTION OR GANGRENE: ICD-10-CM

## 2024-04-15 ENCOUNTER — APPOINTMENT (OUTPATIENT)
Dept: SURGERY | Facility: CLINIC | Age: 69
End: 2024-04-15
Payer: MEDICARE

## 2024-04-15 VITALS
HEIGHT: 60 IN | HEART RATE: 74 BPM | BODY MASS INDEX: 20.03 KG/M2 | SYSTOLIC BLOOD PRESSURE: 124 MMHG | TEMPERATURE: 98.1 F | DIASTOLIC BLOOD PRESSURE: 76 MMHG | WEIGHT: 102 LBS

## 2024-04-15 PROCEDURE — 99024 POSTOP FOLLOW-UP VISIT: CPT

## 2024-04-15 NOTE — ASSESSMENT
[FreeTextEntry1] : patient is well, but c/o b/l groin discomfort.    Incisions are c/d/i and healing well. groins are soft.  discussed that she was still in the early post op period. She can resume activity as tolerated, and f/u in 1 month.

## (undated) DEVICE — NDL HYPO SAFE 22G X 1.5" (BLACK)

## (undated) DEVICE — TUBING STRYKEFLOW II SUCTION / IRRIGATOR

## (undated) DEVICE — DRSG STERISTRIPS 0.25 X 3"

## (undated) DEVICE — GLV 7.5 PROTEXIS (WHITE)

## (undated) DEVICE — SUT VICRYL 3-0 27" SH UNDYED

## (undated) DEVICE — WARMING BLANKET FULL UNDERBODY

## (undated) DEVICE — BLADE SURGICAL #15 CARBON

## (undated) DEVICE — VENODYNE/SCD SLEEVE CALF MEDIUM

## (undated) DEVICE — TUBING INSUFFLATION LAP FILTER 10FT

## (undated) DEVICE — SUT BIOSYN 4-0 18" P-12

## (undated) DEVICE — TUBING OLYMPUS INSUFFLATION

## (undated) DEVICE — PACK GENERAL LAPAROSCOPY

## (undated) DEVICE — SHEARS COVIDIEN ENDO SHEAR 5MM X 31CM W UNIPOLAR CAUTERY

## (undated) DEVICE — SYR LUER LOK 10CC

## (undated) DEVICE — SUT VICRYL 0 27" UR-6